# Patient Record
Sex: FEMALE | URBAN - METROPOLITAN AREA
[De-identification: names, ages, dates, MRNs, and addresses within clinical notes are randomized per-mention and may not be internally consistent; named-entity substitution may affect disease eponyms.]

---

## 2024-09-30 ENCOUNTER — OFFICE VISIT (OUTPATIENT)
Dept: URBAN - METROPOLITAN AREA CLINIC 107 | Facility: CLINIC | Age: 34
End: 2024-09-30

## 2024-10-21 ENCOUNTER — OFFICE VISIT (OUTPATIENT)
Dept: URBAN - METROPOLITAN AREA CLINIC 107 | Facility: CLINIC | Age: 34
End: 2024-10-21

## 2024-10-21 ENCOUNTER — LAB OUTSIDE AN ENCOUNTER (OUTPATIENT)
Dept: URBAN - METROPOLITAN AREA CLINIC 107 | Facility: CLINIC | Age: 34
End: 2024-10-21

## 2024-10-21 ENCOUNTER — DASHBOARD ENCOUNTERS (OUTPATIENT)
Age: 34
End: 2024-10-21

## 2024-10-21 VITALS
TEMPERATURE: 98.4 F | BODY MASS INDEX: 22.42 KG/M2 | WEIGHT: 156.6 LBS | HEIGHT: 70 IN | SYSTOLIC BLOOD PRESSURE: 129 MMHG | DIASTOLIC BLOOD PRESSURE: 86 MMHG | HEART RATE: 63 BPM

## 2024-10-21 DIAGNOSIS — D50.9 IRON DEFICIENCY ANEMIA, UNSPECIFIED IRON DEFICIENCY ANEMIA TYPE: ICD-10-CM

## 2024-10-21 DIAGNOSIS — R19.4 ALTERED BOWEL HABITS: ICD-10-CM

## 2024-10-21 DIAGNOSIS — R10.84 GENERALIZED ABDOMINAL PAIN: ICD-10-CM

## 2024-10-21 DIAGNOSIS — R19.5 DARK STOOLS: ICD-10-CM

## 2024-10-21 PROBLEM — 87522002: Status: ACTIVE | Noted: 2024-10-21

## 2024-10-21 RX ORDER — ERYTHROMYCIN 5 MG/G
APP A 1/2 INCH STRIP TO THE AFFECTED LOWER EYELID BID FOR 7 DAYS OINTMENT OPHTHALMIC
Qty: 4 | Refills: 0 | Status: ON HOLD | COMMUNITY
Start: 2011-12-09

## 2024-10-21 RX ORDER — DICYCLOMINE HYDROCHLORIDE 10 MG/1
1 TO 2 CAPSULES CAPSULE ORAL THREE TIMES A DAY
Qty: 90 | Refills: 1 | OUTPATIENT
Start: 2024-10-21 | End: 2024-12-19

## 2024-10-21 RX ORDER — SODIUM, POTASSIUM,MAG SULFATES 17.5-3.13G
AS DIRECTED SOLUTION, RECONSTITUTED, ORAL ORAL
Qty: 1 KIT | Refills: 0 | OUTPATIENT
Start: 2024-10-21

## 2024-10-21 NOTE — HPI-TODAY'S VISIT:
54-year-old female new to the clinic referred by Dr. Costa for generalized abdominal pain.   A copy of this note will be sent to the referring provider.   Labs 11/13/2023.  CMP:Glucose 69, alk phos low at 42.  CBC normal with Hgb 13.9.  Thyroid studies and hemoglobin A1c normal.  She reports intermittent pain for 8-9 months. Pain can be generalized, currently right sided.  Stools are dark from iron, has been on iron for 2 years. No GERD, nausea, vomiting or dyspahgia. Denies weight change.    She doesn't get menses. She Denies pregnancy   She reports rare gas pains in her chest at rest occurs if she overeats, no CP with activity no SOB.

## 2024-10-23 ENCOUNTER — TELEPHONE ENCOUNTER (OUTPATIENT)
Dept: URBAN - METROPOLITAN AREA CLINIC 107 | Facility: CLINIC | Age: 34
End: 2024-10-23

## 2024-10-23 LAB
ALBUMIN: 4.4
ALKALINE PHOSPHATASE: 63
ALT (SGPT): 15
AST (SGOT): 14
BASO (ABSOLUTE): 0.1
BASOS: 1
BILIRUBIN, TOTAL: 0.4
BUN/CREATININE RATIO: 14
BUN: 13
CALCIUM: 9.9
CARBON DIOXIDE, TOTAL: 21
CHLORIDE: 103
CREATININE: 0.92
EGFR: 83
EOS (ABSOLUTE): 0.1
EOS: 2
FERRITIN, SERUM: 89
GLOBULIN, TOTAL: 2.7
GLUCOSE: 74
HEMATOCRIT: 42.6
HEMATOLOGY COMMENTS:: (no result)
HEMOGLOBIN: 14.2
IMMATURE CELLS: (no result)
IMMATURE GRANS (ABS): 0.1
IMMATURE GRANULOCYTES: 1
IRON BIND.CAP.(TIBC): 413
IRON SATURATION: 31
IRON: 126
LYMPHS (ABSOLUTE): 1.6
LYMPHS: 29
MCH: 30.5
MCHC: 33.3
MCV: 91
MONOCYTES(ABSOLUTE): 0.4
MONOCYTES: 7
NEUTROPHILS (ABSOLUTE): 3.3
NEUTROPHILS: 60
NRBC: (no result)
PLATELETS: 286
POTASSIUM: 4.9
PROTEIN, TOTAL: 7.1
RBC: 4.66
RDW: 12.3
SODIUM: 137
UIBC: 287
WBC: 5.5

## 2024-11-06 ENCOUNTER — CLAIMS CREATED FROM THE CLAIM WINDOW (OUTPATIENT)
Dept: URBAN - METROPOLITAN AREA SURGERY CENTER 25 | Facility: SURGERY CENTER | Age: 34
End: 2024-11-06
Payer: COMMERCIAL

## 2024-11-06 ENCOUNTER — CLAIMS CREATED FROM THE CLAIM WINDOW (OUTPATIENT)
Dept: URBAN - METROPOLITAN AREA CLINIC 4 | Facility: CLINIC | Age: 34
End: 2024-11-06
Payer: COMMERCIAL

## 2024-11-06 DIAGNOSIS — R19.4 CHANGE IN BOWEL HABIT: ICD-10-CM

## 2024-11-06 DIAGNOSIS — D50.9 ANEMIA, IRON DEFICIENCY: ICD-10-CM

## 2024-11-06 DIAGNOSIS — K63.89 OTHER SPECIFIED DISEASES OF INTESTINE: ICD-10-CM

## 2024-11-06 DIAGNOSIS — D50.9 IRON DEFICIENCY ANEMIA, UNSPECIFIED: ICD-10-CM

## 2024-11-06 DIAGNOSIS — K63.5 BENIGN COLON POLYPS: ICD-10-CM

## 2024-11-06 PROCEDURE — 88305 TISSUE EXAM BY PATHOLOGIST: CPT | Performed by: PATHOLOGY

## 2024-11-06 PROCEDURE — 45385 COLONOSCOPY W/LESION REMOVAL: CPT | Performed by: INTERNAL MEDICINE

## 2024-11-06 PROCEDURE — 45380 COLONOSCOPY AND BIOPSY: CPT | Performed by: INTERNAL MEDICINE

## 2024-11-06 PROCEDURE — 00811 ANES LWR INTST NDSC NOS: CPT | Performed by: NURSE ANESTHETIST, CERTIFIED REGISTERED

## 2024-11-06 RX ORDER — ERYTHROMYCIN 5 MG/G
APP A 1/2 INCH STRIP TO THE AFFECTED LOWER EYELID BID FOR 7 DAYS OINTMENT OPHTHALMIC
Qty: 4 | Refills: 0 | Status: ON HOLD | COMMUNITY
Start: 2011-12-09

## 2024-11-06 RX ORDER — SODIUM, POTASSIUM,MAG SULFATES 17.5-3.13G
AS DIRECTED SOLUTION, RECONSTITUTED, ORAL ORAL
Qty: 1 KIT | Refills: 0 | Status: ACTIVE | COMMUNITY
Start: 2024-10-21

## 2024-11-06 RX ORDER — DICYCLOMINE HYDROCHLORIDE 10 MG/1
1 TO 2 CAPSULES CAPSULE ORAL THREE TIMES A DAY
Qty: 90 | Refills: 1 | Status: ACTIVE | COMMUNITY
Start: 2024-10-21 | End: 2024-12-19

## 2024-12-04 ENCOUNTER — OFFICE VISIT (OUTPATIENT)
Dept: URBAN - METROPOLITAN AREA CLINIC 107 | Facility: CLINIC | Age: 34
End: 2024-12-04
Payer: COMMERCIAL

## 2024-12-04 VITALS
HEART RATE: 64 BPM | RESPIRATION RATE: 18 BRPM | SYSTOLIC BLOOD PRESSURE: 81 MMHG | OXYGEN SATURATION: 99 % | HEIGHT: 70 IN | WEIGHT: 156.2 LBS | TEMPERATURE: 98.1 F | DIASTOLIC BLOOD PRESSURE: 66 MMHG | BODY MASS INDEX: 22.36 KG/M2

## 2024-12-04 DIAGNOSIS — Z86.0100 HISTORY OF COLON POLYPS: ICD-10-CM

## 2024-12-04 DIAGNOSIS — D50.9 IRON DEFICIENCY ANEMIA, UNSPECIFIED IRON DEFICIENCY ANEMIA TYPE: ICD-10-CM

## 2024-12-04 DIAGNOSIS — K58.1 IRRITABLE BOWEL SYNDROME WITH CONSTIPATION: ICD-10-CM

## 2024-12-04 DIAGNOSIS — R10.84 GENERALIZED ABDOMINAL PAIN: ICD-10-CM

## 2024-12-04 DIAGNOSIS — R19.5 DARK STOOLS: ICD-10-CM

## 2024-12-04 DIAGNOSIS — R14.3 EXCESSIVE GAS: ICD-10-CM

## 2024-12-04 PROBLEM — 440630006: Status: ACTIVE | Noted: 2024-12-04

## 2024-12-04 PROCEDURE — 99213 OFFICE O/P EST LOW 20 MIN: CPT | Performed by: NURSE PRACTITIONER

## 2024-12-04 RX ORDER — SODIUM, POTASSIUM,MAG SULFATES 17.5-3.13G
AS DIRECTED SOLUTION, RECONSTITUTED, ORAL ORAL
Qty: 1 KIT | Refills: 0 | Status: DISCONTINUED | COMMUNITY
Start: 2024-10-21

## 2024-12-04 RX ORDER — DICYCLOMINE HYDROCHLORIDE 10 MG/1
1 TO 2 CAPSULES CAPSULE ORAL THREE TIMES A DAY
Qty: 90 | Refills: 1 | Status: ACTIVE | COMMUNITY
Start: 2024-10-21 | End: 2024-12-19

## 2024-12-04 RX ORDER — ERYTHROMYCIN 5 MG/G
APP A 1/2 INCH STRIP TO THE AFFECTED LOWER EYELID BID FOR 7 DAYS OINTMENT OPHTHALMIC
Qty: 4 | Refills: 0 | Status: DISCONTINUED | COMMUNITY
Start: 2011-12-09

## 2024-12-04 NOTE — HPI-TODAY'S VISIT:
34-year-old female here for colonoscopy follow-up.    Last seen 10/21/2024 for generalized abdominal pain, MOHSEN, altered bowel habits and dark stools. On iron which is likely causing dark stools started on dicyclomine for pain. Labs ultrasound and colonoscopy ordered for further evaluation. Titrate fiber and MiraLAX to effect. Offered acid suppressing medicine for gas she declined continue Tums or Gas-X as needed.  Labs 11/13/2023.  CMP:Glucose 69, ALK phos low at 42.  CBC normal with Hgb 13.9.  Thyroid studies and hemoglobin A1c normal.  She reports intermittent pain for 8-9 months. Pain can be generalized, currently right sided.  Stools are dark from iron, has been on iron for 2 years. No GERD, nausea, vomiting or dysphagia. Denies weight change. She doesn't get menses. She Denies pregnancy She reports rare gas pains in her chest at rest occurs if she overeats, no CP with activity no SOB.  Interval history, 12/4/2024 Labs 10/23/2024.  Iron studies normal with iron 126, iron saturation 31%.  Ferritin normal at 89.  CMP and CBC normal with Hgb 14.2. Ultrasound abdomen complete 11/1/2024 was normal. Colonoscopy 11/6/2024 8 mm polyp found in ascending colon which was removed.  Colonoscopy otherwise normal.  Polyp was benign mucosal polyp.  Random colon biopsy normal.  Due for surveillance colonoscopy in 5 years.  Today she reports increased gas.  Constipation improved. Still on probiotic.  No GERD, belching or dyspepsia symptoms.  Continues to have some abdominal pain.